# Patient Record
Sex: FEMALE | Race: WHITE
[De-identification: names, ages, dates, MRNs, and addresses within clinical notes are randomized per-mention and may not be internally consistent; named-entity substitution may affect disease eponyms.]

---

## 2019-03-06 ENCOUNTER — HOSPITAL ENCOUNTER (EMERGENCY)
Dept: HOSPITAL 80 - FED | Age: 24
Discharge: HOME | End: 2019-03-06
Payer: COMMERCIAL

## 2019-03-06 VITALS — SYSTOLIC BLOOD PRESSURE: 121 MMHG | DIASTOLIC BLOOD PRESSURE: 47 MMHG
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
## 2019-03-06 DIAGNOSIS — S05.02XA: Primary | ICD-10-CM

## 2019-03-06 PROCEDURE — 08C1XZZ EXTIRPATION OF MATTER FROM LEFT EYE, EXTERNAL APPROACH: ICD-10-PCS

## 2019-03-06 NOTE — EDPHY
H & P


Stated Complaint: poss metal in her left eye


Source: Patient


Exam Limitations: No limitations





- Personal History


LMP (Females 10-55): Extended Cycle BCP/Inj


Current Tetanus/Diphtheria Vaccine: Yes


Current Tetanus Diphtheria and Acellular Pertussis (TDAP): Yes





- Medical/Surgical History


Hx Asthma: No


Hx Chronic Respiratory Disease: No


Hx Diabetes: No


Hx Cardiac Disease: No


Hx Renal Disease: No


Hx Cirrhosis: No


Hx Alcoholism: No


Hx HIV/AIDS: No


Hx Splenectomy or Spleen Trauma: No


Other PMH: wisdom teeth





- Social History


Smoking Status: Never smoked


Time Seen by Provider: 03/06/19 21:23


HPI/ROS: 


HPI:  This is a 23-year-old female who presents with





Chief Complaint: possible metal in her left eye





Location:  Left eye


Quality:  Foreign body sensation


Duration:  2 days


Signs and Symptoms: no fever, no nausea, no vomiting, no photophobia, no noise 

sensitivity, no neck stiffness, no ear pain, no tinnitus, no nasal congestion, 

no sinus pressure, no weakness, no radiation, no aura, no visual blurring, no 

vision discharge


Timing:  Gradual onset


Severity:  Mild-to-moderate


Context:  Patient works as a , wears a mask and a shield, presents with 2 

day history of foreign body sensation around 5 or 6:00 position on her iris 

with noticing a tiny black speck on her left pupil this afternoon.  She admits 

that she has been rubbing her eye.  Last eye exam was 2 weeks ago at [facility].  Reports tetanus is up-to-date.


Modifying Factors:  None





Comment: 








ROS:   A comprehensive 10 system review of systems is otherwise negative aside 

from elements mentioned in the history of present illness. 





MEDICAL/SURGICAL/SOCIAL HISTORY: 


Medical history:  Generally healthy.  Takes birth control pills.


Surgical history:  Denies


Social history:  Employed.  Nonsmoker.  Family history noncontributory.








General appearance:  Well-developed, well-nourished, young adult white female, 

appropriate and polite.  Nontoxic-appearing.


Visual Acuity:  noted from Nurse's notes.


Pupils:  equal round and reactive to light. EOMI.


Lids:  no edema or swelling


Skin:  no proptosis, no periorbital erythema or swelling, no vesicles


Conjunctivae:  not injected, no discharge, small metal foreign body noted on 

iris very pinpoint in nature


Cornea:  exam with fluorescein shows uptake at the 5 o'clock position


Anterior chamber:  normal, no hyphema or hypopyon, no rust ring appreciated.


 (Lillie Zazueta)


Constitutional: 





 Initial Vital Signs











Temperature (C)  37.0 C   03/06/19 20:55


 


Heart Rate  85   03/06/19 20:55


 


Respiratory Rate  16   03/06/19 20:55


 


Blood Pressure  121/47 H  03/06/19 20:55


 


O2 Sat (%)  98   03/06/19 20:55








 











O2 Delivery Mode               Room Air














Allergies/Adverse Reactions: 


 





[id number] Antimigraine Agents Allergy (Verified 03/06/19 20:58)


 








Home Medications: 














 Medication  Instructions  Recorded


 


Birthcontrol  03/06/19














Medical Decision Making


Procedures: 


Procedure:  Foreign body removal from cornea:


Anesthesia:  Topical proparacaine.


After verbal consent from the patient, an embedded foreign body was removed 

from the cornea of the left eye.  The foreign body was removed manually using a 

needle using direct visualization.  Following removal there was no significant 

rust ring.  There were no complications and the patient tolerated the procedure 

well.  The procedure was performed by myself.


 (Lillie Zazueta)


ED Course/Re-evaluation: 


Vital signs reviewed and stable upon arrival.


Tetanus is up-to-date.


Tiny black piece of metal removed from eye without complications; no rust ring 

seen.


Also noted superficial corneal abrasion


Placed on tobramycin with Ophthalmology follow-up.








This patient was seen under the supervision of my secondary supervising 

physician.  I evaluated care for this patient independently.  Discussed this 

patient with Dr. Wesley who did not see the patient.  


 (Lillie Zazueta)





I did not see this patient while she was in the emergency department.  However 

her care was discussed with the PA while the patient was in the department.  I 

agree with treatment plan and management (Mark Wesley)


Differential Diagnosis: 


Differential diagnosis includes but is not limited to foreign body, rust ring 

conjunctivitis.


 (Lillie Zazueta)





- Data Points


Medications Given: 





 








Discontinued Medications





Proparacaine HCl (Alcaine 0.5%)  1 drops OP EDNOW ONE


   Stop: 03/06/19 21:25


   Last Admin: 03/06/19 21:41 Dose:  1 drops


Tobramycin (Tobrex 0.3% Opht Drops Prepack)  1 btl TAKEHOME EDNOW ONE


   Stop: 03/06/19 21:51


   Last Admin: 03/06/19 22:03 Dose:  1 btl








Departure





- Departure


Disposition: Home, Routine, Self-Care


Clinical Impression: 


Left corneal abrasion


Qualifiers:


 Encounter type: initial encounter Qualified Code(s): S05.02XA - Injury of 

conjunctiva and corneal abrasion without foreign body, left eye, initial 

encounter





Foreign body in eyeball, left


Qualifiers:


 Encounter type: initial encounter Qualified Code(s): S05.52XA - Penetrating 

wound with foreign body of left eyeball, initial encounter





Condition: Good


Instructions:  Tobramycin (Into the eye), Corneal Abrasion (ED), Eye Foreign 

Body (ED)


Additional Instructions: 


Apply Tobramycin 1-2 drops into your left eye every 4 hr while awake x 5 days. 


Apply proparacaine 1 drop every 6 hr while awake x 1 day only. 


Please avoid using your hands to touch your eyes.


Wash your hands frequently with mild soap and water. 


Apply cool compresses for 15-20 minutes at a time several times per day for the 

next 1-2 days. 


Follow up with ophthalmology in 5-7 days. 








Eye Complaint:


Return to the Emergency Department for any increase in eye pain, redness, 

swelling, discharge or any worsening of your vision. 


Referrals: 


[facility] [Surgical Assistant] - As per Instructions ([facility] 

[facility] in 7 days)